# Patient Record
Sex: MALE | Race: OTHER
[De-identification: names, ages, dates, MRNs, and addresses within clinical notes are randomized per-mention and may not be internally consistent; named-entity substitution may affect disease eponyms.]

---

## 2017-04-11 ENCOUNTER — HOSPITAL ENCOUNTER (OUTPATIENT)
Dept: HOSPITAL 61 - US | Age: 42
Discharge: HOME | End: 2017-04-11
Attending: FAMILY MEDICINE
Payer: COMMERCIAL

## 2017-04-11 DIAGNOSIS — R31.9: Primary | ICD-10-CM

## 2017-04-11 PROCEDURE — 76870 US EXAM SCROTUM: CPT

## 2017-04-11 NOTE — RAD
Scrotal ultrasound 4/11/2017



Clinical history: Hematuria.



Technique: Using a combination of real-time ultrasound imaging and color-flow

and pulse upper imaging techniques, duplex evaluation of the scrotal sac and

its contents was entered. Multiple images were obtained.



Findings: Both testicles are within normal limits in size and echogenicity.

The right testicle measures 4.4 x 3.4 x 2.2 cm in longitudinal, transverse,

and AP dimensions. The left testicle measures 4.3 x 3.3 x 2.2 cm in size. No

focal abnormality of either testicle is seen. Normal color flow and pulse

Doppler imaging to both testicles is noted. Both epididymal heads are within

normal limits in size and echogenicity. No hydrocele or varicocele is seen.



Impression: Negative study.

## 2017-09-15 NOTE — RAD
Indication pain.



AP and lateral views of the left knee were obtained.



There are no significant degenerative changes. No acute finding is seen. There

is a small joint effusion.

## 2019-01-05 NOTE — PHYS DOC
Past Medical History


Past Medical History:  No Pertinent History


Past Surgical History:  No Surgical History


Alcohol Use:  Occasionally


Drug Use:  None





Adult General


Chief Complaint


Chief Complaint:  SORE THROAT





HPI


HPI





Patient is a 43  year old male who presents with sore throat. This started 

approximately 48 hours ago and has been getting worse over time. Patient was 

seen at an urgent care yesterday, had 2 strep test performed that were both 

negative by his report. He reports increased pain with swallowing. Patient 

reports difficulty swallowing Tylenol due to the discomfort. He denies any 

nasal congestion. Reports increased pain with swallowing any foods.[]





Review of Systems


Review of Systems





Constitutional: Denies chills, reports subjective fever []


Eyes: Denies change in visual acuity, redness, or eye pain []


HENT: See history of present illness, no nasal congestion[]


Respiratory: Denies cough or shortness of breath []


Cardiovascular: No chest pain or palpitations[]


GI: Denies abdominal pain, nausea, vomiting, bloody stools or diarrhea []


: Denies dysuria or hematuria []


Musculoskeletal: Denies back pain or joint pain []


Integument: Denies rash or skin lesions []


Neurologic: Denies headache, focal weakness or sensory changes []


Endocrine: Denies polyuria or polydipsia []





All other systems were reviewed and found to be within normal limits, except as 

documented in this note.





Current Medications


Current Medications





Current Medications








 Medications


  (Trade)  Dose


 Ordered  Sig/Hakan  Start Time


 Stop Time Status Last Admin


Dose Admin


 


 Dexamethasone


 Sodium Phosphate


  (Decadron)  10 mg  1X  ONCE  1/5/19 05:00


 1/5/19 05:01   





 


 Penicillin G


 Benzathine


  (Bicillin L-A)  1,200,000


 unit  1X  ONCE  1/5/19 05:00


 1/5/19 05:01   














Allergies


Allergies





Allergies








Coded Allergies Type Severity Reaction Last Updated Verified


 


  No Known Drug Allergies    1/5/19 No











Physical Exam


Physical Exam





Constitutional: Well developed, well nourished, no acute distress, non-toxic 

appearance. []


HENT: Normocephalic, atraumatic, bilateral external ears normal, oropharynx 

moist, enlarged tonsils, bilaterally symmetric, uvula midline, no trismus, nose 

normal. []


Eyes: PERRLA, EOMI, conjunctiva normal, no discharge. [] 


Neck: Normal range of motion, no tenderness, supple, no stridor. Anterior and 

posterior chain lymphadenopathy bilaterally, no nuchal rigidity[] 


Cardiovascular:Heart rate regular rhythm, no murmur []


Lungs & Thorax:  Bilateral breath sounds clear to auscultation []


Abdomen: Bowel sounds normal, soft, no tenderness, no masses, no pulsatile 

masses. No hepato-or splenomegaly[] 


Skin: Warm, dry, no erythema, no rash. [] 


Back: No tenderness, no CVA tenderness. [] 


Extremities: No tenderness, no cyanosis, no clubbing, ROM intact, no edema. [] 


Neurologic: Alert and oriented X 3, normal motor function, normal sensory 

function, no focal deficits noted. []


Psychologic: Affect normal, judgement normal, mood normal. []





Current Patient Data


Vital Signs





 Vital Signs








  Date Time  Temp Pulse Resp B/P (MAP) Pulse Ox O2 Delivery O2 Flow Rate FiO2


 


1/5/19 04:14 98.3 110 20 155/74 (101) 99 Room Air  





 98.3       











EKG


EKG


[]





Radiology/Procedures


Radiology/Procedures


[]





Course & Med Decision Making


Course & Med Decision Making


Pertinent Labs and Imaging studies reviewed. (See chart for details)





Medical decision making: Patient has a pharyngitis that will be addressed with 

antibiotics and steroids, there is no evidence of airway compromise. No 

evidence of peritonsillar abscess, meningitis, nor retropharyngeal abscess.[]





Dragon Disclaimer


Dragon Disclaimer


This electronic medical record was generated, in whole or in part, using a 

voice recognition dictation system.





Departure


Departure


Impression:  


 Primary Impression:  


 Pharyngitis


Disposition:  01 HOME, SELF-CARE


Condition:  GOOD


Referrals:  


DANNY WILLIS MD (PCP)


Follow-up in 2 days


Patient Instructions:  Viral and Bacterial Pharyngitis





Additional Instructions:  


Drink plenty of fluids. Follow-up with your regular doctor in 2 days. Return to 

the ER if worsening discomfort, and able to swallow, or any other concerns.


Scripts


Ibuprofen (IBUPROFEN) 100 Mg/5 Ml Oral.susp


400 MG PO Q6HRS PRN for MILD PAIN / TEMP, #120 MISC


   Prov: MILLIKASEYBELL ALTAMIRANO         1/5/19





Problem Qualifiers








 Primary Impression:  


 Pharyngitis


 Pharyngitis/tonsillitis etiology:  unspecified etiology  Qualified Codes:  

J02.9 - Acute pharyngitis, unspecified








BELL DELAROSA DO Jan 5, 2019 04:35

## 2019-01-07 NOTE — PHYS DOC
Past Medical History


Past Medical History:  No Pertinent History


Past Surgical History:  No Surgical History


Alcohol Use:  Occasionally


Drug Use:  None





Adult General


Chief Complaint


Chief Complaint:  FACE PROBLEM





HPI


HPI





Patient is a 43  year old male who presents complaining of sore throat and 

swelling to the left side of his neck that began 5 days ago. Patient states he 

was seen at urgent care 5 days ago and head negative strep test. He states he 

was seen in the ED 3 days ago, given penicillin injection and Decadron. He 

states the swelling went down for that then the swelling began the next day. 

Patient states the swelling on the left side of the neck has increased 

significantly. Patient denies any difficulty breathing or swallowing though he 

states it's painful when he swallows. He states he was seen by the PCP in this 

send him to the ED for CT. Denies any fever coughing or congestion.





Review of Systems


Review of Systems





Constitutional: Denies fever or chills []


Eyes: Denies change in visual acuity, redness, or eye pain []


HENT: Reports sore throat and left neck swelling. Denies nasal congestion 


Respiratory: Denies cough or shortness of breath []


Cardiovascular: No additional information not addressed in HPI []


GI: Denies abdominal pain, nausea, vomiting, bloody stools or diarrhea []


: Denies dysuria or hematuria []


Musculoskeletal: Denies back pain or joint pain []


Integument: Denies rash or skin lesions []


Neurologic: Denies headache, focal weakness or sensory changes []








All other systems were reviewed and found to be within normal limits, except as 

documented in this note.





Current Medications


Current Medications





Current Medications








 Medications


  (Trade)  Dose


 Ordered  Sig/Hakan  Start Time


 Stop Time Status Last Admin


Dose Admin


 


 Clindamycin


 Phosphate  50 ml @ 


 100 mls/hr  1X  ONCE  1/7/19 16:45


 1/7/19 17:14 DC 1/7/19 17:42


100 MLS/HR


 


 Info


  (CONTRAST GIVEN


 -- Rx MONITORING)  1 each  PRN DAILY  PRN  1/7/19 15:45


 1/9/19 15:44   





 


 Iohexol


  (Omnipaque 300


 Mg/ml)  70 ml  1X  ONCE  1/7/19 15:30


 1/7/19 15:31 DC 1/7/19 17:10


70 ML


 


 Ketorolac


 Tromethamine


  (Toradol 30mg


 Vial)  30 mg  1X  ONCE  1/7/19 15:30


 1/7/19 15:31 DC 1/7/19 16:05


30 MG


 


 Methylprednisolone


 Sodium Succinate


  (SOLU-Medrol


 125MG VIAL)  125 mg  1X  ONCE  1/7/19 15:30


 1/7/19 15:31 DC 1/7/19 16:16


125 MG


 


 Sodium Chloride  1,000 ml @ 


 1,000 mls/hr  1X  ONCE  1/7/19 15:30


 1/7/19 16:29 DC 1/7/19 16:03


1,000 MLS/HR











Allergies


Allergies





Allergies








Coded Allergies Type Severity Reaction Last Updated Verified


 


  No Known Drug Allergies    1/5/19 No











Physical Exam


Physical Exam





Constitutional: Well developed, well nourished, no acute distress, non-toxic 

appearance. []


HENT: Normocephalic, atraumatic, bilateral external ears normal, oropharynx 

moist, no oral exudates, nose normal. []


airway is open


Posterior pharynx with mild erythema. No exudate


+3 left anterior cervical adenopathy.


+1 right anterior cervical adenopathy


Eyes: PERRLA, EOMI, conjunctiva normal, no discharge. [] 


Neck: Normal range of motion, no tenderness, supple, no stridor. [] 


Cardiovascular:Heart rate regular rhythm, no murmur []


Lungs & Thorax:  Bilateral breath sounds clear to auscultation []


Abdomen: Bowel sounds normal, soft, no tenderness, no masses, no pulsatile 

masses. [] 


Skin: Warm, dry, no erythema, no rash. [] 


Back: No tenderness, no CVA tenderness. [] 


Extremities: No tenderness, no cyanosis, no clubbing, ROM intact, no edema. [] 


Neurologic: Alert and oriented X 3, normal motor function, normal sensory 

function, no focal deficits noted. []


Psychologic: Affect normal, judgement normal, mood normal. []





Current Patient Data


Vital Signs





 Vital Signs








  Date Time  Temp Pulse Resp B/P (MAP) Pulse Ox O2 Delivery O2 Flow Rate FiO2


 


1/7/19 18:00  76 20  99   


 


1/7/19 17:30    134/71 (92)    


 


1/7/19 15:18 98.6     Room Air  





 98.6       








Lab Values





 Laboratory Tests








Test


 1/7/19


16:12


 


White Blood Count


 13.5 x10^3/uL


(4.0-11.0)  H


 


Red Blood Count


 4.20 x10^6/uL


(4.30-5.70)  L


 


Hemoglobin


 13.3 g/dL


(13.0-17.5)


 


Hematocrit


 37.6 %


(39.0-53.0)  L


 


Mean Corpuscular Volume


 90 fL ()





 


Mean Corpuscular Hemoglobin 32 pg (25-35)  


 


Mean Corpuscular Hemoglobin


Concent 35 g/dL


(31-37)


 


Red Cell Distribution Width


 13.3 %


(11.5-14.5)


 


Platelet Count


 326 x10^3/uL


(140-400)


 


Neutrophils (%) (Auto) 77 % (31-73)  H


 


Lymphocytes (%) (Auto) 16 % (24-48)  L


 


Monocytes (%) (Auto) 8 % (0-9)  


 


Eosinophils (%) (Auto) 0 % (0-3)  


 


Basophils (%) (Auto) 0 % (0-3)  


 


Neutrophils # (Auto)


 10.3 x10^3uL


(1.8-7.7)  H


 


Lymphocytes # (Auto)


 2.1 x10^3/uL


(1.0-4.8)


 


Monocytes # (Auto)


 1.0 x10^3/uL


(0.0-1.1)


 


Eosinophils # (Auto)


 0.0 x10^3/uL


(0.0-0.7)


 


Basophils # (Auto)


 0.0 x10^3/uL


(0.0-0.2)


 


Erythrocyte Sedimentation Rate 85 (0-15)  H


 


Sodium Level


 141 mmol/L


(136-145)


 


Potassium Level


 3.7 mmol/L


(3.5-5.1)


 


Chloride Level


 104 mmol/L


()


 


Carbon Dioxide Level


 27 mmol/L


(21-32)


 


Anion Gap 10 (6-14)  


 


Blood Urea Nitrogen


 20 mg/dL


(8-26)


 


Creatinine


 1.3 mg/dL


(0.7-1.3)


 


Estimated GFR


(Cockcroft-Gault) 60.2  





 


Glucose Level


 94 mg/dL


(70-99)


 


Calcium Level


 9.3 mg/dL


(8.5-10.1)


 


C-Reactive Protein,


Quantitative 210.3 mg/L


(0-3.3)  H





 Laboratory Tests


1/7/19 16:12








 Laboratory Tests


1/7/19 16:12











EKG


EKG


[]





Radiology/Procedures


Radiology/Procedures


[]PROCEDURE: CT SOFT TISSUE NECK W/CONTRAST





Examination: CT SOFT TISSUE NECK W/CONTRAST


 


History: lt.jaw swelling; eval for abscess; Omni 300, 70ml


 


Comparison/Correlation: None


 


Findings: Axial images of the neck were obtained following IV contrast. 


Sagittal and coronal reformatted images were provided.


 


Globes and optic nerves are unremarkable. Visualized paranasal sinuses are


unremarkable. Maxillary sinus ostia are patent. Temporal bones or joints 


are unremarkable. Bony structures are unremarkable.


 


Bilateral lower central incisor dental implants are present right lower 


lateral incisor dental implant also seen. Periapical lucency is noted 


involving the right central and lateral incisors. Nasopharynx is 


symmetric. The upper oropharynx is symmetric. At the lower oropharynx and 


hypopharyngeal region, soft tissue swelling is noted. This is 


predominantly inferior to the level of the tonsils. There is a gas and 


slightly high density fluid  collection


Subjacent to the left side of the hyoid bone. Gaseous component extends 


superiorly medial to the hyoid bone. This process measures approximately 


2.5 cm transverse by 2.5 cm anteroposterior by approximately 2.8 cm 


longitudinal.


 


Enlarged left submandibular gland is present with surrounding stranding. 


Thickening of the left platysma noted. There is no loculated collection. 


Soft tissue thickening of left sided musculature inferior to the level of 


the hyoid bone and superficial to the thyroid cartilage noted. There is no


well-demarcated collection. Significant edema however is evident involving


the left sternohyoid and left thyrohyoid muscles. Edema of the neck is 


noted superficial to these muscles as well. No enlarged thoracic lymph 


nodes.


 


Multiple left jugular chain lymph nodes are present but not enlarged.


 


Bony structures are unremarkable.


 


Edema of the left false cord is noted. Right false cord unremarkable. True


vocal cords are unremarkable.


 


Thyroid gland is unremarkable.


 


Partially visualized lung apices unremarkable.


 


Impression:


Abscess collection is noted about the left hyoid bone. Significant gaseous


component. Underlying necrotizing component is presumed. Inflammatory 


changes noted involving left submandibular gland which is enlarged. Edema 


of the soft tissues of the left side of the neck identified.


 


Dental implants are evident. Periapical lucencies involving the lower 


right central and lower right lateral incisor noted. Findings raise 


question periodontal disease. Correlation with previous exams is 


recommended however in this patient who has undergone dental implant 


placement.


 


Electronically signed by: Jarrett Rosario MD (1/7/2019 5:46 PM) [facility]














DICTATED and SIGNED BY:     MELANY AN MD


DATE:     01/07/19 1731





Course & Med Decision Making


Course & Med Decision Making


Pertinent Labs and Imaging studies reviewed. (See chart for details)





This is a 43-year-old male patient sent to the ED today by the PCP to rule out 

peritonsillar abscess. Patient has had sore throat and neck swelling for six 

days. Patient has been to urgent care, has been seen in the ED 3 days ago. 

Symptoms have gotten worse. Vitals on arrival temperature 98.6 heart rate 80, 

respirations 16 on room air, blood pressure 128/78, O2 sats 99% on room air. 

Patient has no respiratory distress. Patient was given Solu-Medrol, and 

clindamycin and arrival to the ED.





CBC with a WBC of 13.5 and a left shift, sedimentation rate 85, CRP to 10, BMP 

with no acute findings,


CT of the neck soft tissue


Abscess collection is noted about the left hyoid bone. Significant gaseous


component. Underlying necrotizing component is presumed. Inflammatory 


changes noted involving left submandibular gland which is enlarged. Edema 


of the soft tissues of the left side of the neck identified.


 


Dental implants are evident. Periapical lucencies involving the lower 


right central and lower right lateral incisor noted. Findings raise 


question periodontal disease. Correlation with previous exams is 


recommended however in this patient who has undergone dental implant 


placement.





18:54 Dr. Luis Rahman accepted patient at [facility]. They requested we wait 

until they call us back with a bed before patient is transferred.





Dragon Disclaimer


Dragon Disclaimer


This electronic medical record was generated, in whole or in part, using a 

voice recognition dictation system.





Departure


Departure


Impression:  


 Primary Impression:  


 Necrotizing fasciitis


 Additional Impression:  


 Abscess, peritonsillar


Disposition:  05 TRANSFER OTHER


Condition:  STABLE


Referrals:  


DANNY WILLIS MD (PCP)





Problem Qualifiers











NAYTONIA DICKEY Jan 7, 2019 15:39

## 2019-01-07 NOTE — RAD
Examination: CT SOFT TISSUE NECK W/CONTRAST

 

History: lt.jaw swelling; eval for abscess; Omni 300, 70ml

 

Comparison/Correlation: None

 

Findings: Axial images of the neck were obtained following IV contrast. 

Sagittal and coronal reformatted images were provided.

 

Globes and optic nerves are unremarkable. Visualized paranasal sinuses are

unremarkable. Maxillary sinus ostia are patent. Temporal bones or joints 

are unremarkable. Bony structures are unremarkable.

 

Bilateral lower central incisor dental implants are present right lower 

lateral incisor dental implant also seen. Periapical lucency is noted 

involving the right central and lateral incisors. Nasopharynx is 

symmetric. The upper oropharynx is symmetric. At the lower oropharynx and 

hypopharyngeal region, soft tissue swelling is noted. This is 

predominantly inferior to the level of the tonsils. There is a gas and 

slightly high density fluid  collection

Subjacent to the left side of the hyoid bone. Gaseous component extends 

superiorly medial to the hyoid bone. This process measures approximately 

2.5 cm transverse by 2.5 cm anteroposterior by approximately 2.8 cm 

longitudinal.

 

Enlarged left submandibular gland is present with surrounding stranding. 

Thickening of the left platysma noted. There is no loculated collection. 

Soft tissue thickening of left sided musculature inferior to the level of 

the hyoid bone and superficial to the thyroid cartilage noted. There is no

well-demarcated collection. Significant edema however is evident involving

the left sternohyoid and left thyrohyoid muscles. Edema of the neck is 

noted superficial to these muscles as well. No enlarged thoracic lymph 

nodes.

 

Multiple left jugular chain lymph nodes are present but not enlarged.

 

Bony structures are unremarkable.

 

Edema of the left false cord is noted. Right false cord unremarkable. True

vocal cords are unremarkable.

 

Thyroid gland is unremarkable.

 

Partially visualized lung apices unremarkable.

 

Impression:

Abscess collection is noted about the left hyoid bone. Significant gaseous

component. Underlying necrotizing component is presumed. Inflammatory 

changes noted involving left submandibular gland which is enlarged. Edema 

of the soft tissues of the left side of the neck identified.

 

Dental implants are evident. Periapical lucencies involving the lower 

right central and lower right lateral incisor noted. Findings raise 

question periodontal disease. Correlation with previous exams is 

recommended however in this patient who has undergone dental implant 

placement.

 

Electronically signed by: Jarrett Rosario MD (1/7/2019 5:46 PM) [facility]

## 2019-03-11 NOTE — RAD
Indication:swelling

 

TECHNIQUE: 3 views of the left knee

 

COMPARISON:9/15/2017.

 

FINDINGS/

impression:

No acute fracture or dislocation. Small suprapatellar effusion. Stable 

nonspecific calcific density projecting superior to the fibular head.

 

Electronically signed by: William Motley DO (3/11/2019 2:05 AM) [id number]

## 2019-03-11 NOTE — PHYS DOC
Past Medical History


Past Medical History:  No Pertinent History


Past Surgical History:  No Surgical History


Alcohol Use:  Occasionally


Drug Use:  None





Adult General


Chief Complaint


Chief Complaint:  KNEE SWELLING





HPI


HPI





Patient is a 43  year old M P/W KNEE PAIN AND SWLLING X THREE DAYS NO FEVER.


NO HX OF DIABETES


NO TRAUMA.


MOM HAS GOUT.


PAIN MODERATE NONRADIATING LOCALIZED LEFT KNEE





Review of Systems


Review of Systems





Constitutional: Denies fever or chills []


Eyes: Denies change in visual acuity, redness, or eye pain []


HENT: Denies nasal congestion or sore throat []


Respiratory: Denies cough or shortness of breath []


Cardiovascular: No additional information not addressed in HPI []





Integument: Denies rash or skin lesions []


Neurologic: Denies headache, focal weakness or sensory changes []


Endocrine: Denies polyuria or polydipsia []





All other systems were reviewed and found to be within normal limits, except as 

documented in this note.





Current Medications


Current Medications





Current Medications








 Medications


  (Trade)  Dose


 Ordered  Sig/Hakan  Start Time


 Stop Time Status Last Admin


Dose Admin


 


 Acetaminophen/


 Hydrocodone Bitart


  (Lortab 5/325)  2 tab  1X  ONCE  3/10/19 23:30


 3/10/19 23:31 DC 3/10/19 23:30


2 TAB


 


 Lidocaine HCl  20 ml  1X  ONCE  3/10/19 23:30


 3/10/19 23:31 DC 3/10/19 23:30


20 ML











Allergies


Allergies





Allergies








Coded Allergies Type Severity Reaction Last Updated Verified


 


  No Known Drug Allergies    1/5/19 No











Physical Exam


Physical Exam





Constitutional: Well developed, well nourished, no acute distress, non-toxic 

appearance. []


HENT: Normocephalic, atraumatic, bilateral external ears normal, oropharynx 

moist, no oral exudates, nose normal. []


Eyes: PERRLA, EOMI, conjunctiva normal, no discharge. [] 





Abdomen: Bowel sounds normal, soft, no tenderness, no masses, no pulsatile 

masses. [] 


Skin: Warm, dry, no erythema, no rash. [] NO ERYTHEMA AT THE KNEE


Back: No tenderness, no CVA tenderness. [] 


Extremities: EFFUSION NOTED TOT HE LEFT KNEE, ROM SLIGHTLY REDUCED DUE TO 

SWELLING BUT ACTUALLY ONLY MILD PAIN WITH ROM PASSIVELY  DEGREES.


Neurologic: Alert and oriented X 3, normal motor function, normal sensory 

function, no focal deficits noted. []


Psychologic: Affect normal, judgement normal, mood normal. []





Current Patient Data


Vital Signs





 Vital Signs








  Date Time  Temp Pulse Resp B/P (MAP) Pulse Ox O2 Delivery O2 Flow Rate FiO2


 


3/11/19 02:40  87 16  98 Room Air  


 


3/10/19 23:18 99.5   160/70 (100)    





 99.5       








Lab Values





 Laboratory Tests








Test


 3/10/19


00:20 3/10/19


23:40


 


White Blood Count


 14.5 x10^3/uL


(4.0-11.0)  H 





 


Red Blood Count


 4.67 x10^6/uL


(4.30-5.70) 





 


Hemoglobin


 14.2 g/dL


(13.0-17.5) 





 


Hematocrit


 42.4 %


(39.0-53.0) 





 


Mean Corpuscular Volume


 91 fL ()


 





 


Mean Corpuscular Hemoglobin 30 pg (25-35)   


 


Mean Corpuscular Hemoglobin


Concent 33 g/dL


(31-37) 





 


Red Cell Distribution Width


 13.5 %


(11.5-14.5) 





 


Platelet Count


 313 x10^3/uL


(140-400) 





 


Neutrophils (%) (Auto) 82 % (31-73)  H 


 


Lymphocytes (%) (Auto) 12 % (24-48)  L 


 


Monocytes (%) (Auto) 6 % (0-9)   


 


Eosinophils (%) (Auto) 0 % (0-3)   


 


Basophils (%) (Auto) 0 % (0-3)   


 


Neutrophils # (Auto)


 11.9 x10^3uL


(1.8-7.7)  H 





 


Lymphocytes # (Auto)


 1.8 x10^3/uL


(1.0-4.8) 





 


Monocytes # (Auto)


 0.8 x10^3/uL


(0.0-1.1) 





 


Eosinophils # (Auto)


 0.0 x10^3/uL


(0.0-0.7) 





 


Basophils # (Auto)


 0.1 x10^3/uL


(0.0-0.2) 





 


Sodium Level


 140 mmol/L


(136-145) 





 


Potassium Level


 4.0 mmol/L


(3.5-5.1) 





 


Chloride Level


 101 mmol/L


() 





 


Carbon Dioxide Level


 27 mmol/L


(21-32) 





 


Anion Gap 12 (6-14)   


 


Blood Urea Nitrogen


 19 mg/dL


(8-26) 





 


Creatinine


 1.1 mg/dL


(0.7-1.3) 





 


Estimated GFR


(Cockcroft-Gault) 73.1  


 





 


BUN/Creatinine Ratio 17 (6-20)   


 


Glucose Level


 110 mg/dL


(70-99)  H 





 


Uric Acid


 8.3 mg/dL


(3.5-7.2)  H 





 


Calcium Level


 9.1 mg/dL


(8.5-10.1) 





 


Total Bilirubin


 0.3 mg/dL


(0.2-1.0) 





 


Aspartate Amino Transferase


(AST) 5 U/L (15-37)


L 





 


Alanine Aminotransferase (ALT)


 26 U/L (16-63)


 





 


Alkaline Phosphatase


 87 U/L


() 





 


Total Protein


 8.2 g/dL


(6.4-8.2) 





 


Albumin


 3.7 g/dL


(3.4-5.0) 





 


Albumin/Globulin Ratio


 0.8 (1.0-1.7)


L 





 


Body Fluid Source  Synovial  


 


Body Fluid Color  Yellow  


 


Body Fluid Clarity  Cloudy  


 


Body Fluid Nucleated Cells


 


 70221 /cmm


(Not


 


Body Fluid Mononuclear WBCs


(%) 


 6 %  





 


Body Fluid Polymorphonuclear


Cells 


 94 %  





 


Body Fluid Total RBCs Counted


 


 50 /cmm (Not


Established)





 Laboratory Tests


3/10/19 00:20








 Laboratory Tests


3/10/19 00:20














EKG


EKG


[]





Radiology/Procedures


Radiology/Procedures


[]


Impressions:


NO FRACTURE





Course & Med Decision Making


Course & Med Decision Making


Pertinent Labs and Imaging studies reviewed. (See chart for details)





[]KNEE SWELLING





GOUT V. LESS LIKELY SEPTIC JOINT (NO DEFINITE RISK FACTORS FOR THIS)





VERBAL CONESNT OBTAINED; AREA PREPPED AND DRAPED USUAL STERILE FASHION, LIDO 

SUBQ, 20GA INSERTED INTO THE JOINT 60 ML STRAW COLORED FLUID REMOVED EASILY, PT 

TOLERATED WELL. BAND AID APPLIED.





NOTED 45K WBC


NOTED ELEV URIC ACID


WBC PERIPH 14.5 


99.5 TEMP





PT FEELS MUCH MUCH BETTER AFTER REMOVAL OF FLUID ABLE TO RANGE THE KNEE BETTER.





SUSPECT GOUT MOST LIKELY , HAD DISCUSSION IN DETAIL 10-15 MIN WITH PT AND 

MOTHER ABOUT PENDING CULTURES, HAVE NOT YET DEFINITIVELY RULED OUT SEPTIC 

JOINT. COME BACK RIGHT AWAY FOR FEVER, AND COME BACK ANYWAY HERE OR PMD TUESDAY 

SO WE CAN FOLLOW UP THE CULTURES FACE TO FACE. HE IS AGREEABLE.





TRIAL OF COLCHICINE AND NORCO


PT AND MOM AGREEABLE





Ladan Disclaimer


Dragon Disclaimer


This electronic medical record was generated, in whole or in part, using a 

voice recognition dictation system.





Departure


Departure


Impression:  


 Primary Impression:  


 Gout


Disposition:  01 HOME, SELF-CARE


Condition:  STABLE


Patient Instructions:  Gout, Easy-to-Read


Scripts


Hydrocodone/Apap 5-325 (NORCO 5-325 TABLET) 1 Each Tablet


1-2 EACH PO PRN Q6HRS PRN for PAIN, #15


   as needed for pain


   Prov: TRAM VICTOR MD         3/11/19 


Colchicine (COLCRYS) 0.6 Mg Tablet


0.6 MG PO UD, #8 TAB


   take two tablets at the first sign of gout flare, followed


   by one tablet one hour later.


   on day 2 through six, take one tablet once daily.


   Prov: TRAM VICTOR MD         3/11/19











TRAM VICTOR MD Mar 11, 2019 03:10